# Patient Record
Sex: MALE | Race: WHITE
[De-identification: names, ages, dates, MRNs, and addresses within clinical notes are randomized per-mention and may not be internally consistent; named-entity substitution may affect disease eponyms.]

---

## 2019-03-26 ENCOUNTER — HOSPITAL ENCOUNTER (OUTPATIENT)
Dept: HOSPITAL 46 - OPS | Age: 59
Discharge: HOME | End: 2019-03-26
Attending: SURGERY
Payer: MEDICARE

## 2019-03-26 VITALS — WEIGHT: 231.99 LBS | BODY MASS INDEX: 34.36 KG/M2 | HEIGHT: 69 IN

## 2019-03-26 DIAGNOSIS — F41.9: ICD-10-CM

## 2019-03-26 DIAGNOSIS — I10: ICD-10-CM

## 2019-03-26 DIAGNOSIS — E78.5: ICD-10-CM

## 2019-03-26 DIAGNOSIS — K63.5: ICD-10-CM

## 2019-03-26 DIAGNOSIS — Z88.0: ICD-10-CM

## 2019-03-26 DIAGNOSIS — D12.6: Primary | ICD-10-CM

## 2019-03-26 DIAGNOSIS — F32.9: ICD-10-CM

## 2019-03-26 DIAGNOSIS — Z79.899: ICD-10-CM

## 2019-03-26 DIAGNOSIS — J45.20: ICD-10-CM

## 2019-03-26 DIAGNOSIS — E66.9: ICD-10-CM

## 2019-03-26 DIAGNOSIS — Z88.5: ICD-10-CM

## 2019-03-26 DIAGNOSIS — Z83.71: ICD-10-CM

## 2019-03-26 PROCEDURE — 0DBE8ZX EXCISION OF LARGE INTESTINE, VIA NATURAL OR ARTIFICIAL OPENING ENDOSCOPIC, DIAGNOSTIC: ICD-10-PCS | Performed by: SURGERY

## 2019-03-26 PROCEDURE — G0500 MOD SEDAT ENDO SERVICE >5YRS: HCPCS

## 2019-03-26 NOTE — NUR
03/26/19 1133 [place]
1127 PATIENT ARRIVES TO PACU SLEEPING, AWAKENS WITH VERBAL STIMULI,
ANSWERS QUESTIONS APPROPRIATELY. BACK TO SLEEP. RESP EVEN AND
UNLABORED, NC AT 3 LITERS, WITH SATS 99%. PATIENT PASSING GAS.
1130 NC OFF.
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
